# Patient Record
Sex: FEMALE | Race: WHITE | URBAN - METROPOLITAN AREA
[De-identification: names, ages, dates, MRNs, and addresses within clinical notes are randomized per-mention and may not be internally consistent; named-entity substitution may affect disease eponyms.]

---

## 2022-06-17 ENCOUNTER — LAB OUTSIDE AN ENCOUNTER (OUTPATIENT)
Dept: URBAN - METROPOLITAN AREA CLINIC 86 | Facility: CLINIC | Age: 72
End: 2022-06-17

## 2022-06-17 ENCOUNTER — OFFICE VISIT (OUTPATIENT)
Dept: URBAN - METROPOLITAN AREA CLINIC 86 | Facility: CLINIC | Age: 72
End: 2022-06-17
Payer: MEDICARE

## 2022-06-17 ENCOUNTER — WEB ENCOUNTER (OUTPATIENT)
Dept: URBAN - METROPOLITAN AREA CLINIC 86 | Facility: CLINIC | Age: 72
End: 2022-06-17

## 2022-06-17 VITALS
BODY MASS INDEX: 35.87 KG/M2 | DIASTOLIC BLOOD PRESSURE: 78 MMHG | HEART RATE: 79 BPM | WEIGHT: 155 LBS | SYSTOLIC BLOOD PRESSURE: 133 MMHG | HEIGHT: 55 IN | TEMPERATURE: 97.1 F

## 2022-06-17 DIAGNOSIS — G89.29 CHRONIC PAIN: ICD-10-CM

## 2022-06-17 DIAGNOSIS — M06.9 RHEUMATOID ARTHRITIS: ICD-10-CM

## 2022-06-17 DIAGNOSIS — E66.9 OBESE: ICD-10-CM

## 2022-06-17 DIAGNOSIS — M35.3 POLYMYALGIA: ICD-10-CM

## 2022-06-17 DIAGNOSIS — Z85.3 HISTORY OF BREAST CANCER: ICD-10-CM

## 2022-06-17 DIAGNOSIS — H40.9 GLAUCOMA: ICD-10-CM

## 2022-06-17 DIAGNOSIS — Z79.899 HIGH RISK MEDICATION USE: ICD-10-CM

## 2022-06-17 DIAGNOSIS — Z71.89 VACCINE COUNSELING: ICD-10-CM

## 2022-06-17 DIAGNOSIS — E78.5 HYPERLIPIDEMIA: ICD-10-CM

## 2022-06-17 DIAGNOSIS — K76.0 FATTY LIVER: ICD-10-CM

## 2022-06-17 DIAGNOSIS — R74.8 ABNORMAL LIVER ENZYMES: ICD-10-CM

## 2022-06-17 PROCEDURE — 99205 OFFICE O/P NEW HI 60 MIN: CPT

## 2022-06-17 RX ORDER — CELECOXIB 200 MG/1
1 CAPSULE WITH FOOD CAPSULE ORAL ONCE A DAY
Status: ACTIVE | COMMUNITY

## 2022-06-17 RX ORDER — ALENDRONATE SODIUM 70 MG/1
1 TABLET 30 MINUTES BEFORE THE FIRST FOOD, BEVERAGE OR MEDICINE OF THE DAY WITH PLAIN WATER TABLET ORAL
Status: ACTIVE | COMMUNITY

## 2022-06-17 RX ORDER — ABATACEPT 125 MG/ML
1 ML INJECTION, SOLUTION SUBCUTANEOUS
Status: ACTIVE | COMMUNITY

## 2022-06-17 RX ORDER — LEUCOVORIN CALCIUM 5 MG/1
1 TABLET TABLET ORAL
Status: ACTIVE | COMMUNITY

## 2022-06-17 RX ORDER — TEMAZEPAM 30 MG/1
1 CAPSULE AT BEDTIME AS NEEDED CAPSULE ORAL ONCE A DAY
Status: ACTIVE | COMMUNITY

## 2022-06-17 RX ORDER — DULOXETINE 60 MG/1
1 CAPSULE CAPSULE, DELAYED RELEASE PELLETS ORAL ONCE A DAY
Status: ACTIVE | COMMUNITY

## 2022-06-17 RX ORDER — FLUTICASONE PROPIONATE AND SALMETEROL XINAFOATE 230; 21 UG/1; UG/1
2 PUFFS AEROSOL, METERED RESPIRATORY (INHALATION) TWICE A DAY
Status: ACTIVE | COMMUNITY

## 2022-06-17 RX ORDER — EZETIMIBE 10 MG/1
1 TABLET TABLET ORAL ONCE A DAY
Status: ACTIVE | COMMUNITY

## 2022-06-17 RX ORDER — METHOTREXATE SODIUM 2.5 MG/1
10 TABLET ORAL
Status: ACTIVE | COMMUNITY

## 2022-06-17 RX ORDER — FLUTICASONE PROPIONATE 50 UG/1
1 SPRAY IN EACH NOSTRIL SPRAY, METERED NASAL ONCE A DAY
Status: ACTIVE | COMMUNITY

## 2022-06-17 RX ORDER — CALCIUM CARBONATE/VITAMIN D3 600MG-5MCG
1 TABLET WITH A MEAL TABLET ORAL ONCE A DAY
Status: ACTIVE | COMMUNITY

## 2022-06-17 RX ORDER — ASCORBIC ACID 125 MG
AS DIRECTED TABLET,CHEWABLE ORAL ONCE A DAY
Status: ACTIVE | COMMUNITY

## 2022-06-17 RX ORDER — HYDROCODONE BITARTRATE AND ACETAMINOPHEN 7.5; 325 MG/1; MG/1
1 TABLET AS NEEDED TABLET ORAL
Status: ACTIVE | COMMUNITY

## 2022-06-17 NOTE — EXAM-PHYSICAL EXAM
Gen: awake and responsive. Eyes: anicteric, normal lids. Mouth: covered with mask. Nose: covered with mask. Hearing: intact grossly. Neck: trachea midline and no jvd. CV: RRR no s3. Lungs: clear. No wheezes, Abd: Soft, nabs, nr, NT obese, and no hsm. Ext: no sig edema, slight palm erythema. Neuro: moves all 4 ext grossly. No asterixis. Skin: no pruritis and slight palm erythema.

## 2022-06-17 NOTE — HPI-TODAY'S VISIT:
Pt is a 70 yo f being sent in by Dr Carrie Damon for evaluation of liver issues in the background of rheum issues,  A copy of the note will be sent to the referring provider.  Able to review local records.  She says has had breast cancer x 2 and she had taxol based tx post the 2nd one. She has a friend when had that lfts up.  Breast cancer in .  She has been on milk thistle.   2022 labs show wbc 7.7 hg 12.8 and platelet 216 and mcv 92.7.neut 5,2 and lymphs 1.5. cpk 100. Na 140 and k 4.2 and cl 105 and co2 21.9 and bun 11 and cr 0.8 and ca 9.2 and alb 3.9 and alk 68 and ast 53 and alt 66. Tb 0.5. esr 62 and a1c 5.6%.  ideal alt is les than 25.  Chol 230 and trg 110 and hdl 81 and ldl 127. Vit d 41 and cr 0.52 and covid 19 neg.  Chol and ldl could be adding a fatty liver,  Hep b 13.8 immune. B sag neg and a igm neg and b core igm neg and hcv neg. HCV pcr negative.  Pt in note may 4 listed as having inflammation levels high and lfts also up. Rheum wanted us to see to look for other immune issues.  RA and prior mtx and she is still on it. She does not recall ever holding it. She has been on it for years.  Orencia also is not stopped and she has been on that for 5-6 yrs.  PMH: RA, incl fts, joint pain, allergic rhinitis, fatty liver.polymyalgia. Breast cancer and last  for the second time and slow to heal from radiation. Glaucoma. Pneumonia,.   PSH: c section x 2  and  and hernia repair  and breast reconstruction . Bilat mastectomy 2009. Breast lumpectomy and xrt 10 2006. Laser surgery for glaucoma.  SH:  with stage 4 melanoma and having issues with chemo. Not a smoker or drinker and is homemaker.  FH father  59 alcohol liver disease but  lung cancer. Mother alive renal failure. Breast cancer in family. 2 brothers also alcoholic and one commited suicide and other  from sq cell on the lip and spread.  Meds: Celebrex 200mg qd, mtx 2.5mg 10 q week, folic acid 1mg 3 qd, leuvcovorin 5mg post mtx, Orencia 125mg q week, apap hydrocodone 325/7.5mg 1 q 8 hrs prn. Mag citrate 125mg qd, temazepam 30mg qhs, lysine 500mg qd, calcium with d 600mg-200 u qd. Alendronate 70mg po q week, super b complex qd, zetia 10mg qd, Advair hfa 230/21 2 bid, vit c 500mg 2 qd, flonase 50 mcg spray qd, duloxetine 60mg po qd. Glucosamine otc qd. Milk thistle for fatty liver,  Glucosamine she did quit that over a year ago.  NKDA Weight 156.8 and 55.1 inches and bmi 36.29.  Crp 17.9 and esr 109 last visit with rheum. Wbc 10.1 and hg 14.3 ast 78 and alt 76 and alk 115 and na 139 and k 4.6 and cl 102 and cr 0.93.alb 4.5.  MTX: Long term therapy with methotrexate has been associated with development of fatty liver and hepatic fibrosis and, in rare instances, portal hypertension and symptomatic cirrhosis. Symptoms are usually absent until cirrhosis is present, and liver tests are typically normal or minimally and transiently elevated. Routine monitoring of patients with regular liver biopsies done at 1 to 2 year intervals or with cumulative methotrexate doses of 1 to 10 grams demonstrates that approximately 30% of patients develop mild-to-moderate histological abnormalities (fat, cellular unrest, mild inflammation, nuclear atypical) and 2 to 20% of patients develop some degree of hepatic fibrosis. Well documented cases of cirrhosis arising during long term methotrexate therapy have been reported, but cirrhosis is rare in prospective series, even with routine histological monitoring. Patients who develop fibrosis on long term methotrexate therapy often have other risk factors for fatty liver disease, including excessive alcohol use, obesity, diabetes and concurrent administration of other potentially hepatotoxic agents. Use of high doses and daily methotrexate dosing is particularly associated with development of hepatic fibrosis and rates of cirrhosis of greater than 20% after 5 to 10 years of treatment. With more modern dose regimens (5 to 15 mg in one dose weekly with folate supplementation), fibrosis and clinically apparent liver disease are rare even with long term use. The hepatic fibrosis and cirrhosis due to methotrexate typically arise after 2 to 10 years of treatment and can present with ascites, variceal hemorrhage or hepatosplenomegaly. Some patients present with signs and symptoms of portal hypertension, yet have only moderate degrees of fibrosis, suggesting that methotrexate may also cause nodular regeneration. Patients who develop portal hypertension and cirrhosis usually have had minimal or no elevations in serum aminotransferase or alkaline phosphatase levels, and monitoring using serum enzymes appears to be poorly predictive of fibrosis development. Noninvasive markers of hepatic fibrosis, such as serial platelet counts, serum procollagen III aminoterminal peptide (PIIIP), serum bile acids, hepatic ultrasound, advanced imaging techniques and transient elastography may be more efficient in screening for fibrosis in patients on long term methotrexate, but the reliability and accuracy of these approaches has not been documented prospectively. Patients with cirrhosis due to methotrexate are often asymptomatic and the condition tends to be non-progressive, even in those who restart low dose therapy. Rare instances of hepatocellular carcinoma have been reported in patients with suspected methotrexate induced cirrhosis. Likelihood score: A (well known cause of chronic, clinically significant liver injury, portal hypertension and cirrhosis).  Duloxetine: Liver test abnormalities with ALT elevations above 3 times the upper limit of normal have been reported to occur in ~1% of patients on duloxetine, but elevations were usually self-limited and did not require dose modification or discontinuation. Rare instances of acute, clinically apparent episodes of liver injury with marked liver enzyme elevations with or without jaundice have been reported in patients on duloxetine. The onset of injury is usually within 1 to 6 months and the pattern of serum enzyme elevations is usually hepatocellular, but mixed and cholestatic forms have also been described. Fatal cases have been described but their relatedness to duloxetine has been quesitoned. Autoimmune (autoantibodies) and immunoallergic features (rash, fever, eosinophilia) are uncommon.  Likelihood score: B (likely cause of clinically apparent liver injury).  Glucosamine: In controlled trials, glucosamine and its combination with chondroitin have not been linked to serum enzyme elevations or to instances of clinically apparent liver injury. In addition, cases of clinically apparent liver injury have not been reported from prospective trials. Recently, several cases reports and small series of clinically apprent liver injury attributed to glucosamine (with or without chondroitin) have been published, but the relationship of glucosamine itself as opposed to other herbals in the implicated products or to potential contaminants, remains unclear and several cases were considered only "possibly" related to glucosamine. The time to onset is usually 1 to 4 weeks after starting the preparation and the pattern of injury is typically hepatocellular or mixed. At least one instance of acute liver failure has been reported. Immunoallergic features (rash, fever, eosinophilia) can occur, but are usually not prominent. Most patients were reported to recover within 4 to 8 weeks of stopping. There have not been instances of rechallenge with glucosamine, and the purity and concentration of glucosamine in the products used have not been reported.  Likelihood score: D (possible rare cause of clinically apparent liver injury).  Plan: 1. Discussed MTX is a risk to use with underlying fatty liver as more chance to go to fibrosis/cirrhosis. Dr Damon needs to look at options. 2., Duloxetine a risk for inc lfts also but lesser of a risk vs mtx. 3. Glucosamine a risk but off that now and we will see labs now. 4. Labs not typical for fatty liver but maybe meds role for this.. 5. Cannot be certain re immune role as a risk for her also but would  can see how does with med changes and consider bx pending. 6. Needs liver imaging. 7. Would do full liver screens. 8. Reassess post above and see how labs are doing. 9. Explained plans to pt and her .  Stressed to pt the need for social distancing and strict handwashing and wearing a mask and to follow any other new or added CDC recommendations as this is an evolving target.  Duration of the visit was 65 min with 30 min of chart prep reviewing data and information that was available for the visit and setting up in ecw and then an additional 35 min for the face to face visit today with time spent reviewing said material and their clinical correlates and then with this information being used to formulate a treatment plan with the pt and her .

## 2022-06-20 ENCOUNTER — TELEPHONE ENCOUNTER (OUTPATIENT)
Dept: URBAN - METROPOLITAN AREA CLINIC 23 | Facility: CLINIC | Age: 72
End: 2022-06-20

## 2022-06-29 ENCOUNTER — TELEPHONE ENCOUNTER (OUTPATIENT)
Dept: URBAN - METROPOLITAN AREA CLINIC 92 | Facility: CLINIC | Age: 72
End: 2022-06-29

## 2022-06-29 LAB
A/G RATIO: 1.8
AAT, DNA ANALYSIS: (no result)
ACTIN (SMOOTH MUSCLE) ANTIBODY: 40
ADDITIONAL INFORMATION:: (no result)
ALBUMIN: 4.2
ALKALINE PHOSPHATASE: 96
ALT (SGPT): 68
ANTI-CENTROMERE B ANTIBODIES: <0.2
ANTI-DNA (DS) AB QN: <1
ANTI-JO-1: <0.2
ANTICHROMATIN ANTIBODIES: <0.2
ANTISCLERODERMA-70 ANTIBODIES: <0.2
AST (SGOT): 62
BASO (ABSOLUTE): 0.1
BASOS: 1
BILIRUBIN, TOTAL: 0.3
BUN/CREATININE RATIO: 14
BUN: 14
CALCIUM: 9.1
CARBON DIOXIDE, TOTAL: 22
CERULOPLASMIN: 38
CHLORIDE: 105
CREATININE: 0.97
EGFR: 62
EOS (ABSOLUTE): 0.6
EOS: 7
FERRITIN, SERUM: 89
GLOBULIN, TOTAL: 2.3
GLUCOSE: 87
HCV AB: 0.2
HEMATOCRIT: 37.3
HEMATOLOGY COMMENTS:: (no result)
HEMOGLOBIN: 12.3
HEP A AB, TOTAL: NEGATIVE
HEP B CORE AB, TOT: NEGATIVE
HEPATITIS B SURF AB QUANT: 11
IMMATURE CELLS: (no result)
IMMATURE GRANS (ABS): 0
IMMATURE GRANULOCYTES: 1
INTERPRETATION:: (no result)
IRON BIND.CAP.(TIBC): 356
IRON SATURATION: 24
IRON: 84
LYMPHS (ABSOLUTE): 1.4
LYMPHS: 16
Lab: (no result)
Lab: (no result)
MCH: 30
MCHC: 33
MCV: 91
MITOCHONDRIAL (M2) ANTIBODY: <20
MONOCYTES(ABSOLUTE): 0.8
MONOCYTES: 9
NEUTROPHILS (ABSOLUTE): 5.8
NEUTROPHILS: 66
NRBC: (no result)
PLATELETS: 270
POTASSIUM: 4.5
PROTEIN, TOTAL: 6.5
RBC: 4.1
RDW: 13.4
RNP ANTIBODIES: <0.2
SJOGREN'S ANTI-SS-A: <0.2
SJOGREN'S ANTI-SS-B: <0.2
SMITH ANTIBODIES: <0.2
SODIUM: 140
UIBC: 272
WBC: 8.6

## 2022-06-29 NOTE — HPI-TODAY'S VISIT:
Dear Komal Hood, June 17 labs show ama neg at less than 20. [place] reflex screen negative. Smooth muscle antibody positive at 40. Wbc 8.6 hg 12.3 and platelets 270. Mcv 91 and neutrophils 5.8 and lymphs 1.4 normal.  Eosinophils up a little 0.6 and can go up with allergies and show to local provider. Ceruloplasmin 38 normal. Glucose 87 and bun 14 and cr 0.97 and na 140 and k 4.5 and cl 105 and ca 9.1 and alb 4.2 and tb 0.3 and alk 96 and ast 62 and alt 68 and ideal alt less than 25. Ferritin normal 89 and hcv antibody negative. Hepatitis a no immunity seen and need to consider getting that vaccine series. Hepatitis b core total negative indicating no prior hep b exposure.  Hep b immunity low level seen at 11 and negative is less than 9.9 so very close. May need a booster in the near future. Iron sat 24% normal. Jan 2022 ast 53 and alt 66 and so about the same there.  Need to look at the methotrexate role and see what you do if able to come off that. If cannot then can look at duloxetine but mtx is the higher risk med. Fatty liver may be underlying issue also increasing the risk to the mtx use. Please share with local providers. Alpha one pending still. Dr Minor

## 2022-07-19 ENCOUNTER — TELEPHONE ENCOUNTER (OUTPATIENT)
Dept: URBAN - METROPOLITAN AREA CLINIC 92 | Facility: CLINIC | Age: 72
End: 2022-07-19

## 2022-07-19 NOTE — HPI-TODAY'S VISIT:
Anthony Hood, July 19 ultrasound shows the liver to have increased echogenicity with some coarsening of the echotexture.  No lesions were seen.  Liver vessels patent. No dilated bile ducts. Common bile duct was 2 mm. Gallbladder was normal. Pancreas was normal were seen. Right kidney 8 cm and left kidney 9.7 cm with no hydronephrosis.  Spleen normal at 9.9 cm.  Overall they felt that the liver may be fatty, and with possible chronic liver disease.  Given that the liver does appear to be fatty and that there may be some possible chronic liver disease, it is very important that you have a look at the issue of the methotrexate use in this circumstance as we discussed. Please take this information to your local providers to review. Dr Minor

## 2022-07-29 ENCOUNTER — LAB OUTSIDE AN ENCOUNTER (OUTPATIENT)
Dept: URBAN - METROPOLITAN AREA CLINIC 86 | Facility: CLINIC | Age: 72
End: 2022-07-29

## 2022-08-17 ENCOUNTER — LAB OUTSIDE AN ENCOUNTER (OUTPATIENT)
Dept: URBAN - METROPOLITAN AREA CLINIC 86 | Facility: CLINIC | Age: 72
End: 2022-08-17

## 2022-08-17 ENCOUNTER — OFFICE VISIT (OUTPATIENT)
Dept: URBAN - METROPOLITAN AREA CLINIC 86 | Facility: CLINIC | Age: 72
End: 2022-08-17
Payer: MEDICARE

## 2022-08-17 VITALS — WEIGHT: 150 LBS | HEIGHT: 55 IN | BODY MASS INDEX: 34.71 KG/M2

## 2022-08-17 DIAGNOSIS — R74.8 ABNORMAL LIVER ENZYMES: ICD-10-CM

## 2022-08-17 DIAGNOSIS — Z85.3 HISTORY OF BREAST CANCER: ICD-10-CM

## 2022-08-17 DIAGNOSIS — M35.3 POLYMYALGIA: ICD-10-CM

## 2022-08-17 DIAGNOSIS — R93.5 ABNORMAL ULTRASOUND OF ABDOMEN: ICD-10-CM

## 2022-08-17 DIAGNOSIS — M06.9 RHEUMATOID ARTHRITIS: ICD-10-CM

## 2022-08-17 DIAGNOSIS — K76.0 FATTY LIVER: ICD-10-CM

## 2022-08-17 DIAGNOSIS — E66.9 OBESE: ICD-10-CM

## 2022-08-17 DIAGNOSIS — Z79.899 HIGH RISK MEDICATION USE: ICD-10-CM

## 2022-08-17 DIAGNOSIS — G89.29 CHRONIC PAIN: ICD-10-CM

## 2022-08-17 DIAGNOSIS — E78.5 HYPERLIPIDEMIA: ICD-10-CM

## 2022-08-17 DIAGNOSIS — H40.9 GLAUCOMA: ICD-10-CM

## 2022-08-17 DIAGNOSIS — Z71.89 VACCINE COUNSELING: ICD-10-CM

## 2022-08-17 PROCEDURE — 99215 OFFICE O/P EST HI 40 MIN: CPT

## 2022-08-17 RX ORDER — TEMAZEPAM 30 MG/1
1 CAPSULE AT BEDTIME AS NEEDED CAPSULE ORAL ONCE A DAY
Status: ACTIVE | COMMUNITY

## 2022-08-17 RX ORDER — FLUTICASONE PROPIONATE 50 UG/1
1 SPRAY IN EACH NOSTRIL SPRAY, METERED NASAL ONCE A DAY
Status: ACTIVE | COMMUNITY

## 2022-08-17 RX ORDER — CALCIUM CARBONATE/VITAMIN D3 600MG-5MCG
1 TABLET WITH A MEAL TABLET ORAL ONCE A DAY
Status: ACTIVE | COMMUNITY

## 2022-08-17 RX ORDER — ABATACEPT 125 MG/ML
1 ML INJECTION, SOLUTION SUBCUTANEOUS
Status: ACTIVE | COMMUNITY

## 2022-08-17 RX ORDER — LEUCOVORIN CALCIUM 5 MG/1
1 TABLET TABLET ORAL
Status: ACTIVE | COMMUNITY

## 2022-08-17 RX ORDER — ASCORBIC ACID 125 MG
AS DIRECTED TABLET,CHEWABLE ORAL ONCE A DAY
Status: ACTIVE | COMMUNITY

## 2022-08-17 RX ORDER — EZETIMIBE 10 MG/1
1 TABLET TABLET ORAL ONCE A DAY
Status: ACTIVE | COMMUNITY

## 2022-08-17 RX ORDER — CELECOXIB 200 MG/1
1 CAPSULE WITH FOOD CAPSULE ORAL ONCE A DAY
Status: ACTIVE | COMMUNITY

## 2022-08-17 RX ORDER — METHOTREXATE SODIUM 2.5 MG/1
10 TABLET ORAL
Status: ACTIVE | COMMUNITY

## 2022-08-17 RX ORDER — HYDROCODONE BITARTRATE AND ACETAMINOPHEN 7.5; 325 MG/1; MG/1
1 TABLET AS NEEDED TABLET ORAL
Status: ACTIVE | COMMUNITY

## 2022-08-17 RX ORDER — ALENDRONATE SODIUM 70 MG/1
1 TABLET 30 MINUTES BEFORE THE FIRST FOOD, BEVERAGE OR MEDICINE OF THE DAY WITH PLAIN WATER TABLET ORAL
Status: ACTIVE | COMMUNITY

## 2022-08-17 RX ORDER — FLUTICASONE PROPIONATE AND SALMETEROL XINAFOATE 230; 21 UG/1; UG/1
2 PUFFS AEROSOL, METERED RESPIRATORY (INHALATION) TWICE A DAY
Status: ACTIVE | COMMUNITY

## 2022-08-17 RX ORDER — DULOXETINE 60 MG/1
1 CAPSULE CAPSULE, DELAYED RELEASE PELLETS ORAL ONCE A DAY
Status: ACTIVE | COMMUNITY

## 2022-08-17 NOTE — HPI-TODAY'S VISIT:
Pt is a 72 yo f being seen june 2022 and was  sent in by Dr Carrie Damon for evaluation of liver issues in the background of rheum issues,  A copy of the note will be sent to the referring provider.  Cell is [phone] for the attempted Telehealth visit today.  July 19 ultrasound shows the liver to have increased echogenicity with some coarsening of the echotexture.  No lesions were seen.  Liver vessels patent. No dilated bile ducts. Common bile duct was 2 mm. Gallbladder was normal. Pancreas was normal were seen. Right kidney 8 cm and left kidney 9.7 cm with no hydronephrosis.  Spleen normal at 9.9 cm. Overall they felt that the liver may be fatty, and with possible chronic liver disease.  Given that the liver does appear to be fatty and that there may be some possible chronic liver disease suspected,  we discussed that this plus her methotrexate use in this circumstance as we discussed.  She needs to discuss this and options with DR Damon. Running risk a higher risk of cirrhosis with mtx,  She is cutting fats and suagrs on diet,  She has lost some weight but not tracking.  If 5-10% weight loss labs can drop. Weighed 150.  June 17 labs show ama neg at less than 20. [place] reflex screen negative. Smooth muscle antibody positive at 40. Wbc 8.6 hg 12.3 and platelets 270. Mcv 91 and neutrophils 5.8 and lymphs 1.4 normal.  Eosinophils up a little 0.6 and can go up with allergies and show to local provider. Ceruloplasmin 38 normal. Glucose 87 and bun 14 and cr 0.97 and na 140 and k 4.5 and cl 105 and ca 9.1 and alb 4.2 and tb 0.3 and alk 96 and ast 62 and alt 68 and ideal alt less than 25. Ferritin normal 89 and hcv antibody negative. Hepatitis a no immunity seen and need to consider getting that vaccine series. Hepatitis b core total negative indicating no prior hep b exposure.  Hep b immunity low level seen at 11 and negative is less than 9.9 so very close. May need a booster in the near future. Iron sat 24% normal.  Jan 2022 ast 53 and alt 66 and so about the same there.  Need to look at the methotrexate role and see what you do if able to come off that.  She is on duloxetine also with fatty liver can also inc toxicity but the highest risk is the mtx is the higher risk med.  She says has had breast cancer x 2 and she had taxol based tx post the 2nd one. She has a friend when had that lfts up.  Breast cancer in 2009.  She has been on milk thistle.   Jan 2022 labs show wbc 7.7 hg 12.8 and platelet 216 and mcv 92.7.neut 5,2 and lymphs 1.5. cpk 100. Na 140 and k 4.2 and cl 105 and co2 21.9 and bun 11 and cr 0.8 and ca 9.2 and alb 3.9 and alk 68 and ast 53 and alt 66. Tb 0.5. esr 62 and a1c 5.6%.  ideal alt is les than 25.  Chol 230 and trg 110 and hdl 81 and ldl 127. Vit d 41 and cr 0.52 and covid 19 neg.  Chol and ldl could be adding a fatty liver,  Hep b 13.8 immune. B sag neg and a igm neg and b core igm neg and hcv neg. HCV pcr negative.  Pt in note may 4 listed as having inflammation levels high and lfts also up. Rheum wanted us to see to look for other immune issues.  Orencia also is not stopped and she has been on that for 5-6 yrs. Glucosamine she did quit that over a year ago.  NKDA Weight 156.8 and 55.1 inches and bmi 36.29.  Crp 17.9 and esr 109 last visit with rheum. Wbc 10.1 and hg 14.3 ast 78 and alt 76 and alk 115 and na 139 and k 4.6 and cl 102 and cr 0.93.alb 4.5.  MTX: Long term therapy with methotrexate has been associated with development of fatty liver and hepatic fibrosis and, in rare instances, portal hypertension and symptomatic cirrhosis. Symptoms are usually absent until cirrhosis is present, and liver tests are typically normal or minimally and transiently elevated. Routine monitoring of patients with regular liver biopsies done at 1 to 2 year intervals or with cumulative methotrexate doses of 1 to 10 grams demonstrates that approximately 30% of patients develop mild-to-moderate histological abnormalities (fat, cellular unrest, mild inflammation, nuclear atypical) and 2 to 20% of patients develop some degree of hepatic fibrosis. Well documented cases of cirrhosis arising during long term methotrexate therapy have been reported, but cirrhosis is rare in prospective series, even with routine histological monitoring. Patients who develop fibrosis on long term methotrexate therapy often have other risk factors for fatty liver disease, including excessive alcohol use, obesity, diabetes and concurrent administration of other potentially hepatotoxic agents. Use of high doses and daily methotrexate dosing is particularly associated with development of hepatic fibrosis and rates of cirrhosis of greater than 20% after 5 to 10 years of treatment. With more modern dose regimens (5 to 15 mg in one dose weekly with folate supplementation), fibrosis and clinically apparent liver disease are rare even with long term use. The hepatic fibrosis and cirrhosis due to methotrexate typically arise after 2 to 10 years of treatment and can present with ascites, variceal hemorrhage or hepatosplenomegaly. Some patients present with signs and symptoms of portal hypertension, yet have only moderate degrees of fibrosis, suggesting that methotrexate may also cause nodular regeneration. Patients who develop portal hypertension and cirrhosis usually have had minimal or no elevations in serum aminotransferase or alkaline phosphatase levels, and monitoring using serum enzymes appears to be poorly predictive of fibrosis development. Noninvasive markers of hepatic fibrosis, such as serial platelet counts, serum procollagen III aminoterminal peptide (PIIIP), serum bile acids, hepatic ultrasound, advanced imaging techniques and transient elastography may be more efficient in screening for fibrosis in patients on long term methotrexate, but the reliability and accuracy of these approaches has not been documented prospectively. Patients with cirrhosis due to methotrexate are often asymptomatic and the condition tends to be non-progressive, even in those who restart low dose therapy. Rare instances of hepatocellular carcinoma have been reported in patients with suspected methotrexate induced cirrhosis. Likelihood score: A (well known cause of chronic, clinically significant liver injury, portal hypertension and cirrhosis).  Duloxetine: Liver test abnormalities with ALT elevations above 3 times the upper limit of normal have been reported to occur in ~1% of patients on duloxetine, but elevations were usually self-limited and did not require dose modification or discontinuation. Rare instances of acute, clinically apparent episodes of liver injury with marked liver enzyme elevations with or without jaundice have been reported in patients on duloxetine. The onset of injury is usually within 1 to 6 months and the pattern of serum enzyme elevations is usually hepatocellular, but mixed and cholestatic forms have also been described. Fatal cases have been described but their relatedness to duloxetine has been quesitoned. Autoimmune (autoantibodies) and immunoallergic features (rash, fever, eosinophilia) are uncommon.  Likelihood score: B (likely cause of clinically apparent liver injury).  Glucosamine: In controlled trials, glucosamine and its combination with chondroitin have not been linked to serum enzyme elevations or to instances of clinically apparent liver injury. In addition, cases of clinically apparent liver injury have not been reported from prospective trials. Recently, several cases reports and small series of clinically apprent liver injury attributed to glucosamine (with or without chondroitin) have been published, but the relationship of glucosamine itself as opposed to other herbals in the implicated products or to potential contaminants, remains unclear and several cases were considered only "possibly" related to glucosamine. The time to onset is usually 1 to 4 weeks after starting the preparation and the pattern of injury is typically hepatocellular or mixed. At least one instance of acute liver failure has been reported. Immunoallergic features (rash, fever, eosinophilia) can occur, but are usually not prominent. Most patients were reported to recover within 4 to 8 weeks of stopping. There have not been instances of rechallenge with glucosamine, and the purity and concentration of glucosamine in the products used have not been reported.  Likelihood score: D (possible rare cause of clinically apparent liver injury).  Plan: 1. Discussed MTX  needs to be reconsidered as the u,s suggests coarsening and abn labs on it and higher risk to go to cirrhosis. 2.  She will discuss options with rheum. 3. We will order the labs and we will order the mri for her to confirming the coarsening. 4. Pt on others meds of concern but the mtx is the highest risk for her. 5. Duloxetine a risk for inc lfts also but lesser of a risk vs mtx. 6. Glucosamine a risk but off that now and we will see labs now.   Stressed to pt the need for social distancing and strict handwashing and wearing a mask and to follow any other new or added CDC recommendations as this is an evolving target.  Duration of the visit was 43 min with 10 min of chart prep reviewing data and information that was available for the visit and setting up in ecw and then an additional 33 min from 841 ot 914 am for the telemed dox video visit today with time spent reviewing said material and their clinical correlates and then with this information being used to formulate a treatment plan with the pt and her .

## 2022-09-26 ENCOUNTER — TELEPHONE ENCOUNTER (OUTPATIENT)
Dept: URBAN - METROPOLITAN AREA CLINIC 92 | Facility: CLINIC | Age: 72
End: 2022-09-26

## 2022-09-26 NOTE — HPI-TODAY'S VISIT:
Anthony Lovelaceers, September 23 MRI shows lower thorax images without focal lesion. Liver fat was elevated at 8.2% with no significant iron seen in the liver.  No suspicious lesions.  No definite chronic liver disease was seen.  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 Gallbladder was partially decompressed and with no biliary duct dilation seen. Spleen was normal at 10 cm with pancreas showing no dilation of the main pancreatic duct. Adrenal glands were normal. Kidney showed no hydronephrosis.  They did see borderline very small sliding type hiatal hernia. No bowel obstruction was seen. Subcentimeter lymph nodes were noted in the upper abdomen and retroperitoneal area. The celiac trunk and superior mesenteric artery were patent as was the main portal vein. Degenerative changes were seen of the spine. As you recall, the outside u.s scan suggested coarsening of the liver and we wanted to assess the liver fat and fibrosis that was present. We may want to redo the mri again down the road pending your course. Dr Minro

## 2022-10-10 ENCOUNTER — LAB OUTSIDE AN ENCOUNTER (OUTPATIENT)
Dept: URBAN - METROPOLITAN AREA CLINIC 86 | Facility: CLINIC | Age: 72
End: 2022-10-10

## 2022-10-19 ENCOUNTER — CLAIMS CREATED FROM THE CLAIM WINDOW (OUTPATIENT)
Dept: URBAN - METROPOLITAN AREA CLINIC 86 | Facility: CLINIC | Age: 72
End: 2022-10-19
Payer: MEDICARE

## 2022-10-19 ENCOUNTER — LAB OUTSIDE AN ENCOUNTER (OUTPATIENT)
Dept: URBAN - METROPOLITAN AREA CLINIC 86 | Facility: CLINIC | Age: 72
End: 2022-10-19

## 2022-10-19 VITALS
SYSTOLIC BLOOD PRESSURE: 128 MMHG | DIASTOLIC BLOOD PRESSURE: 74 MMHG | HEART RATE: 66 BPM | HEIGHT: 55 IN | WEIGHT: 148 LBS | BODY MASS INDEX: 34.25 KG/M2 | TEMPERATURE: 96.6 F

## 2022-10-19 DIAGNOSIS — E78.5 HYPERLIPIDEMIA: ICD-10-CM

## 2022-10-19 DIAGNOSIS — Z79.899 HIGH RISK MEDICATION USE: ICD-10-CM

## 2022-10-19 DIAGNOSIS — Z78.9 HEPATITIS B CORE ANTIBODY NEGATIVE: ICD-10-CM

## 2022-10-19 DIAGNOSIS — Z71.89 VACCINE COUNSELING: ICD-10-CM

## 2022-10-19 DIAGNOSIS — G89.29 CHRONIC PAIN: ICD-10-CM

## 2022-10-19 DIAGNOSIS — Z85.3 HISTORY OF BREAST CANCER: ICD-10-CM

## 2022-10-19 DIAGNOSIS — M06.9 RHEUMATOID ARTHRITIS: ICD-10-CM

## 2022-10-19 DIAGNOSIS — E66.9 OBESE: ICD-10-CM

## 2022-10-19 DIAGNOSIS — R93.5 ABNORMAL ULTRASOUND OF ABDOMEN: ICD-10-CM

## 2022-10-19 DIAGNOSIS — M35.3 POLYMYALGIA: ICD-10-CM

## 2022-10-19 DIAGNOSIS — R74.8 ABNORMAL LIVER ENZYMES: ICD-10-CM

## 2022-10-19 DIAGNOSIS — H40.9 GLAUCOMA: ICD-10-CM

## 2022-10-19 DIAGNOSIS — K76.0 FATTY LIVER: ICD-10-CM

## 2022-10-19 DIAGNOSIS — M06.89 OTHER SPECIFIED RHEUMATOID ARTHRITIS, MULTIPLE SITES: ICD-10-CM

## 2022-10-19 PROCEDURE — 99215 OFFICE O/P EST HI 40 MIN: CPT

## 2022-10-19 RX ORDER — LEUCOVORIN CALCIUM 5 MG/1
1 TABLET TABLET ORAL
Status: DISCONTINUED | COMMUNITY

## 2022-10-19 RX ORDER — FLUTICASONE PROPIONATE AND SALMETEROL XINAFOATE 230; 21 UG/1; UG/1
2 PUFFS AEROSOL, METERED RESPIRATORY (INHALATION) TWICE A DAY
Status: ACTIVE | COMMUNITY

## 2022-10-19 RX ORDER — ALENDRONATE SODIUM 70 MG/1
1 TABLET 30 MINUTES BEFORE THE FIRST FOOD, BEVERAGE OR MEDICINE OF THE DAY WITH PLAIN WATER TABLET ORAL
Status: ACTIVE | COMMUNITY

## 2022-10-19 RX ORDER — CALCIUM CARBONATE/VITAMIN D3 600MG-5MCG
1 TABLET WITH A MEAL TABLET ORAL ONCE A DAY
Status: ACTIVE | COMMUNITY

## 2022-10-19 RX ORDER — EZETIMIBE 10 MG/1
1 TABLET TABLET ORAL ONCE A DAY
Status: ACTIVE | COMMUNITY

## 2022-10-19 RX ORDER — CELECOXIB 200 MG/1
1 CAPSULE WITH FOOD CAPSULE ORAL ONCE A DAY
Status: ACTIVE | COMMUNITY

## 2022-10-19 RX ORDER — TEMAZEPAM 30 MG/1
1 CAPSULE AT BEDTIME AS NEEDED CAPSULE ORAL ONCE A DAY
Status: ACTIVE | COMMUNITY

## 2022-10-19 RX ORDER — ASCORBIC ACID 125 MG
AS DIRECTED TABLET,CHEWABLE ORAL ONCE A DAY
Status: ACTIVE | COMMUNITY

## 2022-10-19 RX ORDER — HYDROCODONE BITARTRATE AND ACETAMINOPHEN 7.5; 325 MG/1; MG/1
1 TABLET AS NEEDED TABLET ORAL
Status: ACTIVE | COMMUNITY

## 2022-10-19 RX ORDER — ABATACEPT 125 MG/ML
1 ML INJECTION, SOLUTION SUBCUTANEOUS
Status: ACTIVE | COMMUNITY

## 2022-10-19 RX ORDER — METHOTREXATE SODIUM 2.5 MG/1
10 TABLET ORAL
Status: DISCONTINUED | COMMUNITY

## 2022-10-19 RX ORDER — DULOXETINE 60 MG/1
1 CAPSULE CAPSULE, DELAYED RELEASE PELLETS ORAL ONCE A DAY
Status: ACTIVE | COMMUNITY

## 2022-10-19 RX ORDER — FLUTICASONE PROPIONATE 50 UG/1
1 SPRAY IN EACH NOSTRIL SPRAY, METERED NASAL ONCE A DAY
Status: ACTIVE | COMMUNITY

## 2022-10-19 NOTE — HPI-TODAY'S VISIT:
Pt is a 73 yo f being seen Aug 2022 and was  sent in by Dr Carrie Davis for evaluation of liver issues in the background of rheum issues,  A copy of the note will be sent to the referring provider.  Cell is [phone].  Did labs locally and the labs are better: lot better.  Sept 7: esr 79 and crp 6.3 and wbc 8.1 and hg 13.9 and hct 41.8 and plat 290, neutrophils 4836. lymph 1847. na 139 and k 5.2 and cl 100 and co2 29 and bun 12 and cr 0.87 and ca 10 and tb 0.5 and alk 89 and ast 42 and alt 36 . Saray 3: ast 78 and alt 76.  alb 4.4.  She came off MTX now for 2m.  She also is doing keto diet and went to med diet.  She has lost weight 148 now.   September 23 MRI shows lower thorax images without focal lesion. Liver fat was elevated at 8.2% with no significant iron seen in the liver.  No suspicious lesions.  No definite chronic liver disease was seen.  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 Gallbladder was partially decompressed and with no biliary duct dilation seen. Spleen was normal at 10 cm with pancreas showing no dilation of the main pancreatic duct. Adrenal glands were normal. Kidney showed no hydronephrosis.  They did see borderline very small sliding type hiatal hernia. No bowel obstruction was seen. Subcentimeter lymph nodes were noted in the upper abdomen and retroperitoneal area. The celiac trunk and superior mesenteric artery were patent as was the main portal vein. Degenerative changes were seen of the spine. As you recall, the outside u.s scan suggested coarsening of the liver and we wanted to assess the liver fat and fibrosis that was present. We may want to redo the mri again down the road pending your course.  July 19 ultrasound shows the liver to have increased echogenicity with some coarsening of the echotexture.  No lesions were seen.  Liver vessels patent. No dilated bile ducts. Common bile duct was 2 mm. Gallbladder was normal. Pancreas was normal were seen. Right kidney 8 cm and left kidney 9.7 cm with no hydronephrosis.  Spleen normal at 9.9 cm. Overall they felt that the liver may be fatty, and with possible chronic liver disease.  Given that the liver does appear to be fatty and that there may be some possible chronic liver disease suspected,  we discussed that this plus her methotrexate use in this circumstance as we discussed and she did go off this and Dr davis to consider other options now.   The lab drop confirms that we were right.  If 5-10% weight loss labs can drop and she prior  weighed 150 so is dropping..  June 17 labs show ama neg at less than 20. [place] reflex screen negative. Smooth muscle antibody positive at 40. Wbc 8.6 hg 12.3 and platelets 270. Mcv 91 and neutrophils 5.8 and lymphs 1.4 normal.  Eosinophils up a little 0.6 and can go up with allergies and show to local provider. Ceruloplasmin 38 normal. Glucose 87 and bun 14 and cr 0.97 and na 140 and k 4.5 and cl 105 and ca 9.1 and alb 4.2 and tb 0.3 and alk 96 and ast 62 and alt 68 and ideal alt less than 25. Ferritin normal 89 and hcv antibody negative. Hepatitis a no immunity seen and need to consider getting that vaccine series. Hepatitis b core total negative indicating no prior hep b exposure.  Hep b immunity low level seen at 11 and negative is less than 9.9 so very close. May need a booster in the near future. Iron sat 24% normal.  Jan 2022 ast 53 and alt 66 and so about the same there.   She is on duloxetine also with fatty liver can also inc toxicity but the highest risk was the mtx is the higher risk med. Stay on the duloxetine for now.  She says has had breast cancer x 2 and she had taxol based tx post the 2nd one. She has a friend when had that lfts up.  Breast cancer in 2009.  She has been on milk thistle.   Jan 2022 labs show wbc 7.7 hg 12.8 and platelet 216 and mcv 92.7.neut 5,2 and lymphs 1.5. cpk 100. Na 140 and k 4.2 and cl 105 and co2 21.9 and bun 11 and cr 0.8 and ca 9.2 and alb 3.9 and alk 68 and ast 53 and alt 66. Tb 0.5. esr 62 and a1c 5.6%.  ideal alt is les than 25.  Chol 230 and trg 110 and hdl 81 and ldl 127. Vit d 41 and cr 0.52 and covid 19 neg.  Chol and ldl could be adding a fatty liver,  Hep b 13.8 immune. B sag neg and a igm neg and b core igm neg and hcv neg. HCV pcr negative.  Pt in note may 4 listed as having inflammation levels high and lfts also up. Rheum wanted us to see to look for other immune issues.  Orencia also is not stopped and she has been on that for 5-6 yrs. Glucosamine she did quit that over a year ago.  NKDA   Priro to first visit weight 156.8 and 55.1 inches and bmi 36.29.  Crp 17.9 and esr 109 last visit with rheum. Wbc 10.1 and hg 14.3 ast 78 and alt 76 and alk 115 and na 139 and k 4.6 and cl 102 and cr 0.93.alb 4.5.  MTX: Long term therapy with methotrexate has been associated with development of fatty liver and hepatic fibrosis and, in rare instances, portal hypertension and symptomatic cirrhosis. Symptoms are usually absent until cirrhosis is present, and liver tests are typically normal or minimally and transiently elevated. Routine monitoring of patients with regular liver biopsies done at 1 to 2 year intervals or with cumulative methotrexate doses of 1 to 10 grams demonstrates that approximately 30% of patients develop mild-to-moderate histological abnormalities (fat, cellular unrest, mild inflammation, nuclear atypical) and 2 to 20% of patients develop some degree of hepatic fibrosis. Well documented cases of cirrhosis arising during long term methotrexate therapy have been reported, but cirrhosis is rare in prospective series, even with routine histological monitoring. Patients who develop fibrosis on long term methotrexate therapy often have other risk factors for fatty liver disease, including excessive alcohol use, obesity, diabetes and concurrent administration of other potentially hepatotoxic agents. Use of high doses and daily methotrexate dosing is particularly associated with development of hepatic fibrosis and rates of cirrhosis of greater than 20% after 5 to 10 years of treatment. With more modern dose regimens (5 to 15 mg in one dose weekly with folate supplementation), fibrosis and clinically apparent liver disease are rare even with long term use. The hepatic fibrosis and cirrhosis due to methotrexate typically arise after 2 to 10 years of treatment and can present with ascites, variceal hemorrhage or hepatosplenomegaly. Some patients present with signs and symptoms of portal hypertension, yet have only moderate degrees of fibrosis, suggesting that methotrexate may also cause nodular regeneration. Patients who develop portal hypertension and cirrhosis usually have had minimal or no elevations in serum aminotransferase or alkaline phosphatase levels, and monitoring using serum enzymes appears to be poorly predictive of fibrosis development. Noninvasive markers of hepatic fibrosis, such as serial platelet counts, serum procollagen III aminoterminal peptide (PIIIP), serum bile acids, hepatic ultrasound, advanced imaging techniques and transient elastography may be more efficient in screening for fibrosis in patients on long term methotrexate, but the reliability and accuracy of these approaches has not been documented prospectively. Patients with cirrhosis due to methotrexate are often asymptomatic and the condition tends to be non-progressive, even in those who restart low dose therapy. Rare instances of hepatocellular carcinoma have been reported in patients with suspected methotrexate induced cirrhosis. Likelihood score: A (well known cause of chronic, clinically significant liver injury, portal hypertension and cirrhosis).  Duloxetine: Liver test abnormalities with ALT elevations above 3 times the upper limit of normal have been reported to occur in ~1% of patients on duloxetine, but elevations were usually self-limited and did not require dose modification or discontinuation. Rare instances of acute, clinically apparent episodes of liver injury with marked liver enzyme elevations with or without jaundice have been reported in patients on duloxetine. The onset of injury is usually within 1 to 6 months and the pattern of serum enzyme elevations is usually hepatocellular, but mixed and cholestatic forms have also been described. Fatal cases have been described but their relatedness to duloxetine has been quesitoned. Autoimmune (autoantibodies) and immunoallergic features (rash, fever, eosinophilia) are uncommon.  Likelihood score: B (likely cause of clinically apparent liver injury).  Glucosamine: not on that also. In controlled trials, glucosamine and its combination with chondroitin have not been linked to serum enzyme elevations or to instances of clinically apparent liver injury. In addition, cases of clinically apparent liver injury have not been reported from prospective trials. Recently, several cases reports and small series of clinically apprent liver injury attributed to glucosamine (with or without chondroitin) have been published, but the relationship of glucosamine itself as opposed to other herbals in the implicated products or to potential contaminants, remains unclear and several cases were considered only "possibly" related to glucosamine. The time to onset is usually 1 to 4 weeks after starting the preparation and the pattern of injury is typically hepatocellular or mixed. At least one instance of acute liver failure has been reported. Immunoallergic features (rash, fever, eosinophilia) can occur, but are usually not prominent. Most patients were reported to recover within 4 to 8 weeks of stopping. There have not been instances of rechallenge with glucosamine, and the purity and concentration of glucosamine in the products used have not been reported.  Likelihood score: D (possible rare cause of clinically apparent liver injury).  Plan: 1. Dr Dumont to try one of the other biologics for her and no way to predict which one can cause issue and so can try any that she feels is best. She prefers oral to try and so have had pts on those also. 2.  She will discuss final options with rheum. She will let us know which one is picked and labs in 1m and 2m and 3m on it to follow that. 3. We will order for her to do the u.s again in march here. 4.  Telemed in 2 m to check on what she started and what seeing. May need ISMAEL Beck. 5. She will remain on the duloxetine and see how does. 6. Re the fatty liver she will work on diet and exercise of even walking as can.  Stressed to pt the need for social distancing and strict handwashing and wearing a mask and to follow any other new or added CDC recommendations as this is an evolving target.  Duration of the visit was 50 min with 10 min of chart prep reviewing data and information that was available for the visit and setting that up in ecw and then an additional 40 min for the face to face visit with time spent reviewing the data and tests and discussing their clinical correlates and then with this information being used to formulate a treatment plan with the pt and her .

## 2022-11-21 ENCOUNTER — LAB OUTSIDE AN ENCOUNTER (OUTPATIENT)
Dept: URBAN - METROPOLITAN AREA CLINIC 86 | Facility: CLINIC | Age: 72
End: 2022-11-21

## 2022-12-08 ENCOUNTER — TELEPHONE ENCOUNTER (OUTPATIENT)
Dept: URBAN - METROPOLITAN AREA CLINIC 92 | Facility: CLINIC | Age: 72
End: 2022-12-08

## 2022-12-08 LAB
ALBUMIN/GLOBULIN RATIO: 1.6
ALBUMIN: 4.2
ALKALINE PHOSPHATASE: 77
ALT (SGPT): 24
AST (SGOT): 28
BILIRUBIN, DIRECT: 0.1
BILIRUBIN, INDIRECT: 0.3
BILIRUBIN, TOTAL: 0.4
GLOBULIN: 2.7
PROTEIN, TOTAL: 6.9

## 2022-12-08 NOTE — HPI-TODAY'S VISIT:
Dear Komal Hood, December 7 labs total protein 6.9 and albumin 4.2 bilirubin 0.4 direct 0.1 alkaline phosphatase 77 AST 28 and ALT 24. Prior September 7 labs showed AST 42 and ALT 36 so the labs continue to drop even now. As you recall, we thought that this was due to a combination of fatty liver and the methotrexate and you came off that.  You also had stopped the glucosamine. We will review these results with you further at the upcoming visit. Dr. Minor

## 2022-12-12 ENCOUNTER — DASHBOARD ENCOUNTERS (OUTPATIENT)
Age: 72
End: 2022-12-12

## 2022-12-19 ENCOUNTER — OFFICE VISIT (OUTPATIENT)
Dept: URBAN - METROPOLITAN AREA TELEHEALTH 2 | Facility: TELEHEALTH | Age: 72
End: 2022-12-19
Payer: MEDICARE

## 2022-12-19 ENCOUNTER — LAB OUTSIDE AN ENCOUNTER (OUTPATIENT)
Dept: URBAN - METROPOLITAN AREA CLINIC 86 | Facility: CLINIC | Age: 72
End: 2022-12-19

## 2022-12-19 VITALS — HEIGHT: 55 IN | BODY MASS INDEX: 34.25 KG/M2 | WEIGHT: 148 LBS

## 2022-12-19 DIAGNOSIS — R74.8 ABNORMAL LIVER ENZYMES: ICD-10-CM

## 2022-12-19 DIAGNOSIS — M35.3 POLYMYALGIA: ICD-10-CM

## 2022-12-19 DIAGNOSIS — R93.5 ABNORMAL ULTRASOUND OF ABDOMEN: ICD-10-CM

## 2022-12-19 DIAGNOSIS — M06.89 OTHER SPECIFIED RHEUMATOID ARTHRITIS, MULTIPLE SITES: ICD-10-CM

## 2022-12-19 DIAGNOSIS — M06.9 RHEUMATOID ARTHRITIS: ICD-10-CM

## 2022-12-19 DIAGNOSIS — G89.29 CHRONIC PAIN: ICD-10-CM

## 2022-12-19 DIAGNOSIS — K76.0 FATTY LIVER: ICD-10-CM

## 2022-12-19 DIAGNOSIS — H40.9 GLAUCOMA: ICD-10-CM

## 2022-12-19 DIAGNOSIS — Z71.89 VACCINE COUNSELING: ICD-10-CM

## 2022-12-19 DIAGNOSIS — Z85.3 HISTORY OF BREAST CANCER: ICD-10-CM

## 2022-12-19 DIAGNOSIS — Z78.9 HEPATITIS B CORE ANTIBODY NEGATIVE: ICD-10-CM

## 2022-12-19 DIAGNOSIS — E78.5 HYPERLIPIDEMIA: ICD-10-CM

## 2022-12-19 DIAGNOSIS — E66.9 OBESE: ICD-10-CM

## 2022-12-19 DIAGNOSIS — Z79.899 HIGH RISK MEDICATION USE: ICD-10-CM

## 2022-12-19 PROBLEM — 409063005 COUNSELING: Status: ACTIVE | Noted: 2022-06-11

## 2022-12-19 PROBLEM — 161646004 H/O: HIGH RISK MEDICATION: Status: ACTIVE | Noted: 2022-06-11

## 2022-12-19 PROBLEM — 95415006 POLYMYALGIA: Status: ACTIVE | Noted: 2022-06-11

## 2022-12-19 PROBLEM — 55822004 HYPERLIPIDEMIA: Status: ACTIVE | Noted: 2022-06-11

## 2022-12-19 PROBLEM — 197321007 FATTY LIVER: Status: ACTIVE | Noted: 2022-06-11

## 2022-12-19 PROBLEM — 166643006 LIVER ENZYMES ABNORMAL: Status: ACTIVE | Noted: 2022-06-11

## 2022-12-19 PROBLEM — 415076002 PERSONAL HISTORY OF PRIMARY MALIGNANT NEOPLASM OF BREAST: Status: ACTIVE | Noted: 2022-06-11

## 2022-12-19 PROBLEM — 414915002 OBESE: Status: ACTIVE | Noted: 2022-06-11

## 2022-12-19 PROBLEM — 23986001 GLAUCOMA: Status: ACTIVE | Noted: 2022-06-11

## 2022-12-19 PROBLEM — 69896004 RHEUMATOID ARTHRITIS: Status: ACTIVE | Noted: 2022-06-11

## 2022-12-19 PROBLEM — 736693005 HEPATITIS B CORE ANTIBODY NEGATIVE: Status: ACTIVE | Noted: 2022-10-19

## 2022-12-19 PROBLEM — 82423001 CHRONIC PAIN: Status: ACTIVE | Noted: 2022-06-11

## 2022-12-19 PROCEDURE — 99214 OFFICE O/P EST MOD 30 MIN: CPT | Performed by: PHYSICIAN ASSISTANT

## 2022-12-19 RX ORDER — FLUTICASONE PROPIONATE AND SALMETEROL XINAFOATE 230; 21 UG/1; UG/1
2 PUFFS AEROSOL, METERED RESPIRATORY (INHALATION) TWICE A DAY
Status: ACTIVE | COMMUNITY

## 2022-12-19 RX ORDER — HYDROCODONE BITARTRATE AND ACETAMINOPHEN 7.5; 325 MG/1; MG/1
1 TABLET AS NEEDED TABLET ORAL
Status: ACTIVE | COMMUNITY

## 2022-12-19 RX ORDER — ASCORBIC ACID 125 MG
AS DIRECTED TABLET,CHEWABLE ORAL ONCE A DAY
Status: ACTIVE | COMMUNITY

## 2022-12-19 RX ORDER — TEMAZEPAM 30 MG/1
1 CAPSULE AT BEDTIME AS NEEDED CAPSULE ORAL ONCE A DAY
Status: ACTIVE | COMMUNITY

## 2022-12-19 RX ORDER — ABATACEPT 125 MG/ML
1 ML INJECTION, SOLUTION SUBCUTANEOUS
Status: ACTIVE | COMMUNITY

## 2022-12-19 RX ORDER — DULOXETINE 60 MG/1
1 CAPSULE CAPSULE, DELAYED RELEASE PELLETS ORAL ONCE A DAY
Status: ACTIVE | COMMUNITY

## 2022-12-19 RX ORDER — BARICITINIB 2 MG/1
1 TABLET TABLET, FILM COATED ORAL ONCE A DAY
Status: ACTIVE | COMMUNITY

## 2022-12-19 RX ORDER — EZETIMIBE 10 MG/1
1 TABLET TABLET ORAL ONCE A DAY
Status: ACTIVE | COMMUNITY

## 2022-12-19 RX ORDER — CALCIUM CARBONATE/VITAMIN D3 600MG-5MCG
1 TABLET WITH A MEAL TABLET ORAL ONCE A DAY
Status: ACTIVE | COMMUNITY

## 2022-12-19 RX ORDER — CELECOXIB 200 MG/1
1 CAPSULE WITH FOOD CAPSULE ORAL ONCE A DAY
Status: ACTIVE | COMMUNITY

## 2022-12-19 RX ORDER — FLUTICASONE PROPIONATE 50 UG/1
1 SPRAY IN EACH NOSTRIL SPRAY, METERED NASAL ONCE A DAY
Status: ACTIVE | COMMUNITY

## 2022-12-19 RX ORDER — ALENDRONATE SODIUM 70 MG/1
1 TABLET 30 MINUTES BEFORE THE FIRST FOOD, BEVERAGE OR MEDICINE OF THE DAY WITH PLAIN WATER TABLET ORAL
Status: ACTIVE | COMMUNITY

## 2022-12-19 NOTE — HPI-TODAY'S VISIT:
Pt is a 71 yo and was  sent in by Dr Carrie Davis for evaluation of liver issues in the background of rheum issues.   A copy of the note will be sent to the referring provider.  12/20/22 televisit December 7 labs total protein 6.9 and albumin 4.2 bilirubin 0.4 direct 0.1 alkaline phosphatase 77 AST 28 and ALT 24. Prior September 7 labs showed AST 42 and ALT 36 so the labs continue to drop even now. As you recall, we thought that this was due to a combination of fatty liver and the methotrexate and you came off that.  You also had stopped the glucosamine. We will review these results with you further at the upcoming visit.  Cell is [phone].  and she is workin gi the weight  She is taking  olumiant for RA and started prior to last labs that were improving and monitoring notes the medication is not helping the pain much she has labs to do in january as well  RECAP  Did labs locally and the labs are better: lot better.  Sept 7: esr 79 and crp 6.3 and wbc 8.1 and hg 13.9 and hct 41.8 and plat 290, neutrophils 4836. lymph 1847. na 139 and k 5.2 and cl 100 and co2 29 and bun 12 and cr 0.87 and ca 10 and tb 0.5 and alk 89 and ast 42 and alt 36 . Saray 3: ast 78 and alt 76.  alb 4.4.  She came off MTX now for 2m.  She also is doing keto diet and went to med diet.  She has lost weight 148 now.   September 23 MRI shows lower thorax images without focal lesion. Liver fat was elevated at 8.2% with no significant iron seen in the liver.  No suspicious lesions.  No definite chronic liver disease was seen.  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 Gallbladder was partially decompressed and with no biliary duct dilation seen. Spleen was normal at 10 cm with pancreas showing no dilation of the main pancreatic duct. Adrenal glands were normal. Kidney showed no hydronephrosis.  They did see borderline very small sliding type hiatal hernia. No bowel obstruction was seen. Subcentimeter lymph nodes were noted in the upper abdomen and retroperitoneal area. The celiac trunk and superior mesenteric artery were patent as was the main portal vein. Degenerative changes were seen of the spine. As you recall, the outside u.s scan suggested coarsening of the liver and we wanted to assess the liver fat and fibrosis that was present. We may want to redo the mri again down the road pending your course.  July 19 ultrasound shows the liver to have increased echogenicity with some coarsening of the echotexture.  No lesions were seen.  Liver vessels patent. No dilated bile ducts. Common bile duct was 2 mm. Gallbladder was normal. Pancreas was normal were seen. Right kidney 8 cm and left kidney 9.7 cm with no hydronephrosis.  Spleen normal at 9.9 cm. Overall they felt that the liver may be fatty, and with possible chronic liver disease.  Given that the liver does appear to be fatty and that there may be some possible chronic liver disease suspected,  we discussed that this plus her methotrexate use in this circumstance as we discussed and she did go off this and Dr davis to consider other options now.   The lab drop confirms that we were right.  If 5-10% weight loss labs can drop and she prior  weighed 150 so is dropping..  June 17 labs show ama neg at less than 20. [place] reflex screen negative. Smooth muscle antibody positive at 40. Wbc 8.6 hg 12.3 and platelets 270. Mcv 91 and neutrophils 5.8 and lymphs 1.4 normal.  Eosinophils up a little 0.6 and can go up with allergies and show to local provider. Ceruloplasmin 38 normal. Glucose 87 and bun 14 and cr 0.97 and na 140 and k 4.5 and cl 105 and ca 9.1 and alb 4.2 and tb 0.3 and alk 96 and ast 62 and alt 68 and ideal alt less than 25. Ferritin normal 89 and hcv antibody negative. Hepatitis a no immunity seen and need to consider getting that vaccine series. Hepatitis b core total negative indicating no prior hep b exposure.  Hep b immunity low level seen at 11 and negative is less than 9.9 so very close. May need a booster in the near future. Iron sat 24% normal.  Jan 2022 ast 53 and alt 66 and so about the same there.   She is on duloxetine also with fatty liver can also inc toxicity but the highest risk was the mtx is the higher risk med. Stay on the duloxetine for now.  She says has had breast cancer x 2 and she had taxol based tx post the 2nd one. She has a friend when had that lfts up.  Breast cancer in 2009.  She has been on milk thistle.   Jan 2022 labs show wbc 7.7 hg 12.8 and platelet 216 and mcv 92.7.neut 5,2 and lymphs 1.5. cpk 100. Na 140 and k 4.2 and cl 105 and co2 21.9 and bun 11 and cr 0.8 and ca 9.2 and alb 3.9 and alk 68 and ast 53 and alt 66. Tb 0.5. esr 62 and a1c 5.6%.  ideal alt is les than 25.  Chol 230 and trg 110 and hdl 81 and ldl 127. Vit d 41 and cr 0.52 and covid 19 neg.  Chol and ldl could be adding a fatty liver,  Hep b 13.8 immune. B sag neg and a igm neg and b core igm neg and hcv neg. HCV pcr negative.  Pt in note may 4 listed as having inflammation levels high and lfts also up. Rheum wanted us to see to look for other immune issues.  Orencia also is not stopped and she has been on that for 5-6 yrs. Glucosamine she did quit that over a year ago.  BILLY Nair to first visit weight 156.8 and 55.1 inches and bmi 36.29.  Crp 17.9 and esr 109 last visit with rheum. Wbc 10.1 and hg 14.3 ast 78 and alt 76 and alk 115 and na 139 and k 4.6 and cl 102 and cr 0.93.alb 4.5.  MTX: Long term therapy with methotrexate has been associated with development of fatty liver and hepatic fibrosis and, in rare instances, portal hypertension and symptomatic cirrhosis. Symptoms are usually absent until cirrhosis is present, and liver tests are typically normal or minimally and transiently elevated. Routine monitoring of patients with regular liver biopsies done at 1 to 2 year intervals or with cumulative methotrexate doses of 1 to 10 grams demonstrates that approximately 30% of patients develop mild-to-moderate histological abnormalities (fat, cellular unrest, mild inflammation, nuclear atypical) and 2 to 20% of patients develop some degree of hepatic fibrosis. Well documented cases of cirrhosis arising during long term methotrexate therapy have been reported, but cirrhosis is rare in prospective series, even with routine histological monitoring. Patients who develop fibrosis on long term methotrexate therapy often have other risk factors for fatty liver disease, including excessive alcohol use, obesity, diabetes and concurrent administration of other potentially hepatotoxic agents. Use of high doses and daily methotrexate dosing is particularly associated with development of hepatic fibrosis and rates of cirrhosis of greater than 20% after 5 to 10 years of treatment. With more modern dose regimens (5 to 15 mg in one dose weekly with folate supplementation), fibrosis and clinically apparent liver disease are rare even with long term use. The hepatic fibrosis and cirrhosis due to methotrexate typically arise after 2 to 10 years of treatment and can present with ascites, variceal hemorrhage or hepatosplenomegaly. Some patients present with signs and symptoms of portal hypertension, yet have only moderate degrees of fibrosis, suggesting that methotrexate may also cause nodular regeneration. Patients who develop portal hypertension and cirrhosis usually have had minimal or no elevations in serum aminotransferase or alkaline phosphatase levels, and monitoring using serum enzymes appears to be poorly predictive of fibrosis development. Noninvasive markers of hepatic fibrosis, such as serial platelet counts, serum procollagen III aminoterminal peptide (PIIIP), serum bile acids, hepatic ultrasound, advanced imaging techniques and transient elastography may be more efficient in screening for fibrosis in patients on long term methotrexate, but the reliability and accuracy of these approaches has not been documented prospectively. Patients with cirrhosis due to methotrexate are often asymptomatic and the condition tends to be non-progressive, even in those who restart low dose therapy. Rare instances of hepatocellular carcinoma have been reported in patients with suspected methotrexate induced cirrhosis. Likelihood score: A (well known cause of chronic, clinically significant liver injury, portal hypertension and cirrhosis).  Duloxetine: Liver test abnormalities with ALT elevations above 3 times the upper limit of normal have been reported to occur in ~1% of patients on duloxetine, but elevations were usually self-limited and did not require dose modification or discontinuation. Rare instances of acute, clinically apparent episodes of liver injury with marked liver enzyme elevations with or without jaundice have been reported in patients on duloxetine. The onset of injury is usually within 1 to 6 months and the pattern of serum enzyme elevations is usually hepatocellular, but mixed and cholestatic forms have also been described. Fatal cases have been described but their relatedness to duloxetine has been quesitoned. Autoimmune (autoantibodies) and immunoallergic features (rash, fever, eosinophilia) are uncommon.  Likelihood score: B (likely cause of clinically apparent liver injury).  Glucosamine: not on that also. In controlled trials, glucosamine and its combination with chondroitin have not been linked to serum enzyme elevations or to instances of clinically apparent liver injury. In addition, cases of clinically apparent liver injury have not been reported from prospective trials. Recently, several cases reports and small series of clinically apprent liver injury attributed to glucosamine (with or without chondroitin) have been published, but the relationship of glucosamine itself as opposed to other herbals in the implicated products or to potential contaminants, remains unclear and several cases were considered only "possibly" related to glucosamine. The time to onset is usually 1 to 4 weeks after starting the preparation and the pattern of injury is typically hepatocellular or mixed. At least one instance of acute liver failure has been reported. Immunoallergic features (rash, fever, eosinophilia) can occur, but are usually not prominent. Most patients were reported to recover within 4 to 8 weeks of stopping. There have not been instances of rechallenge with glucosamine, and the purity and concentration of glucosamine in the products used have not been reported.  Likelihood score: D (possible rare cause of clinically apparent liver injury).

## 2023-01-21 ENCOUNTER — LAB OUTSIDE AN ENCOUNTER (OUTPATIENT)
Dept: URBAN - METROPOLITAN AREA CLINIC 86 | Facility: CLINIC | Age: 73
End: 2023-01-21

## 2023-03-06 ENCOUNTER — LAB OUTSIDE AN ENCOUNTER (OUTPATIENT)
Dept: URBAN - METROPOLITAN AREA TELEHEALTH 2 | Facility: TELEHEALTH | Age: 73
End: 2023-03-06

## 2023-03-13 ENCOUNTER — LAB OUTSIDE AN ENCOUNTER (OUTPATIENT)
Dept: URBAN - METROPOLITAN AREA TELEHEALTH 2 | Facility: TELEHEALTH | Age: 73
End: 2023-03-13